# Patient Record
Sex: FEMALE | Race: WHITE | NOT HISPANIC OR LATINO | URBAN - METROPOLITAN AREA
[De-identification: names, ages, dates, MRNs, and addresses within clinical notes are randomized per-mention and may not be internally consistent; named-entity substitution may affect disease eponyms.]

---

## 2017-06-08 ENCOUNTER — IMPORTED ENCOUNTER (OUTPATIENT)
Dept: URBAN - METROPOLITAN AREA CLINIC 50 | Facility: CLINIC | Age: 72
End: 2017-06-08

## 2017-06-12 ENCOUNTER — IMPORTED ENCOUNTER (OUTPATIENT)
Dept: URBAN - METROPOLITAN AREA CLINIC 50 | Facility: CLINIC | Age: 72
End: 2017-06-12

## 2018-06-04 ENCOUNTER — IMPORTED ENCOUNTER (OUTPATIENT)
Dept: URBAN - METROPOLITAN AREA CLINIC 50 | Facility: CLINIC | Age: 73
End: 2018-06-04

## 2019-06-03 ENCOUNTER — IMPORTED ENCOUNTER (OUTPATIENT)
Dept: URBAN - METROPOLITAN AREA CLINIC 50 | Facility: CLINIC | Age: 74
End: 2019-06-03

## 2020-06-03 ENCOUNTER — IMPORTED ENCOUNTER (OUTPATIENT)
Dept: URBAN - METROPOLITAN AREA CLINIC 50 | Facility: CLINIC | Age: 75
End: 2020-06-03

## 2021-04-18 ASSESSMENT — VISUAL ACUITY
OS_SC: 20/30-
OD_SC: 20/30-
OD_SC: 20/30+
OS_CC: J1
OD_SC: 20/30+2
OD_OTHER: >20/400.
OD_BAT: >20/400
OD_OTHER: 20/30. 20/50.
OD_OTHER: 20/60. 20/80.
OD_BAT: 20/60
OD_CC: J1
OD_CC: J1+
OD_CC: 20/30+
OD_PH: 20/25
OD_BAT: >20/400
OD_OTHER: >20/400.
OS_SC: 20/40
OD_BAT: 20/30
OD_CC: J1@ 16 IN
OS_CC: 20/40
OS_SC: 20/40
OS_CC: J1+
OS_CC: J1@ 16 IN

## 2021-04-18 ASSESSMENT — TONOMETRY
OS_IOP_MMHG: 14
OS_IOP_MMHG: 14
OD_IOP_MMHG: 15
OD_IOP_MMHG: 14
OS_IOP_MMHG: 15
OS_IOP_MMHG: 16
OD_IOP_MMHG: 14
OD_IOP_MMHG: 14

## 2021-06-04 ENCOUNTER — PREPPED CHART (OUTPATIENT)
Dept: URBAN - METROPOLITAN AREA CLINIC 49 | Facility: CLINIC | Age: 76
End: 2021-06-04

## 2021-06-07 ENCOUNTER — COMPREHENSIVE EXAM (OUTPATIENT)
Dept: URBAN - METROPOLITAN AREA CLINIC 49 | Facility: CLINIC | Age: 76
End: 2021-06-07

## 2021-06-07 DIAGNOSIS — H52.4: ICD-10-CM

## 2021-06-07 DIAGNOSIS — H40.013: ICD-10-CM

## 2021-06-07 DIAGNOSIS — H26.491: ICD-10-CM

## 2021-06-07 PROCEDURE — 92014 COMPRE OPH EXAM EST PT 1/>: CPT

## 2021-06-07 PROCEDURE — 92015 DETERMINE REFRACTIVE STATE: CPT

## 2021-06-07 ASSESSMENT — VISUAL ACUITY
OU_SC: J1
OD_SC: 20/30
OS_SC: 20/50-2
OD_GLARE: 20/30
OD_GLARE: 20/30

## 2021-06-07 ASSESSMENT — TONOMETRY
OS_IOP_MMHG: 15
OD_IOP_MMHG: 14

## 2021-06-07 NOTE — PATIENT DISCUSSION
Secondary to ON asymm ordered HVF/OCT/PACH as a baseline. Patient will be contacted with results. Per patient no family h/o glaucoma.

## 2021-06-17 ENCOUNTER — DIAGNOSTICS ONLY (OUTPATIENT)
Dept: URBAN - METROPOLITAN AREA CLINIC 49 | Facility: CLINIC | Age: 76
End: 2021-06-17

## 2021-06-17 DIAGNOSIS — H40.013: ICD-10-CM

## 2021-06-17 PROCEDURE — 92083 EXTENDED VISUAL FIELD XM: CPT

## 2021-06-17 PROCEDURE — 92133 CPTRZD OPH DX IMG PST SGM ON: CPT

## 2021-06-17 PROCEDURE — 76514 ECHO EXAM OF EYE THICKNESS: CPT

## 2022-07-18 ENCOUNTER — COMPREHENSIVE EXAM (OUTPATIENT)
Dept: URBAN - METROPOLITAN AREA CLINIC 49 | Facility: CLINIC | Age: 77
End: 2022-07-18

## 2022-07-18 DIAGNOSIS — H26.491: ICD-10-CM

## 2022-07-18 DIAGNOSIS — H43.393: ICD-10-CM

## 2022-07-18 DIAGNOSIS — H04.123: ICD-10-CM

## 2022-07-18 PROCEDURE — 92014 COMPRE OPH EXAM EST PT 1/>: CPT

## 2022-07-18 ASSESSMENT — TONOMETRY
OD_IOP_MMHG: 13
OS_IOP_MMHG: 13

## 2022-07-18 ASSESSMENT — VISUAL ACUITY
OS_PH: 20/40
OD_SC: 20/40-2
OD_GLARE: 20/70
OD_GLARE: 20/40
OU_SC: 20/30-2
OU_SC: J1
OS_SC: 20/40-2

## 2023-07-24 ENCOUNTER — COMPREHENSIVE EXAM (OUTPATIENT)
Dept: URBAN - METROPOLITAN AREA CLINIC 49 | Facility: CLINIC | Age: 78
End: 2023-07-24

## 2023-07-24 DIAGNOSIS — H43.393: ICD-10-CM

## 2023-07-24 DIAGNOSIS — H26.491: ICD-10-CM

## 2023-07-24 DIAGNOSIS — H35.361: ICD-10-CM

## 2023-07-24 DIAGNOSIS — D23.112: ICD-10-CM

## 2023-07-24 DIAGNOSIS — H04.123: ICD-10-CM

## 2023-07-24 PROCEDURE — 92134 CPTRZ OPH DX IMG PST SGM RTA: CPT

## 2023-07-24 PROCEDURE — 92014 COMPRE OPH EXAM EST PT 1/>: CPT

## 2023-07-24 ASSESSMENT — VISUAL ACUITY
OS_PH: 20/30
OD_GLARE: >20/400
OD_PH: 20/50
OD_SC: 20/70
OU_SC: J2@16"
OS_SC: 20/50-2
OD_GLARE: 20/200

## 2023-07-24 ASSESSMENT — TONOMETRY
OD_IOP_MMHG: 14
OS_IOP_MMHG: 14

## 2023-08-02 ENCOUNTER — ESTABLISHED PATIENT (OUTPATIENT)
Dept: URBAN - METROPOLITAN AREA CLINIC 49 | Facility: LOCATION | Age: 78
End: 2023-08-02

## 2023-08-02 DIAGNOSIS — H00.12: ICD-10-CM

## 2023-08-02 PROCEDURE — 92012 INTRM OPH EXAM EST PATIENT: CPT

## 2023-08-02 RX ORDER — CEPHALEXIN 500 MG/1: 1 CAPSULE ORAL TWICE A DAY

## 2023-08-02 ASSESSMENT — VISUAL ACUITY
OD_PH: 20/50
OD_SC: 20/60-1
OS_PH: 20/30
OS_SC: 20/40

## 2023-08-02 ASSESSMENT — TONOMETRY
OD_IOP_MMHG: 14
OS_IOP_MMHG: 14

## 2023-08-16 ENCOUNTER — FOLLOW UP (OUTPATIENT)
Dept: URBAN - METROPOLITAN AREA CLINIC 49 | Facility: LOCATION | Age: 78
End: 2023-08-16

## 2023-08-16 DIAGNOSIS — H02.88A: ICD-10-CM

## 2023-08-16 DIAGNOSIS — H04.123: ICD-10-CM

## 2023-08-16 DIAGNOSIS — H00.12: ICD-10-CM

## 2023-08-16 DIAGNOSIS — H02.88B: ICD-10-CM

## 2023-08-16 DIAGNOSIS — H26.491: ICD-10-CM

## 2023-08-16 PROCEDURE — 92012 INTRM OPH EXAM EST PATIENT: CPT

## 2023-08-16 ASSESSMENT — VISUAL ACUITY
OS_SC: 20/40
OD_SC: 20/70-1/+1
OD_PH: 20/50

## 2023-08-16 ASSESSMENT — TONOMETRY
OD_IOP_MMHG: 14
OS_IOP_MMHG: 14

## 2024-08-02 ENCOUNTER — COMPREHENSIVE EXAM (OUTPATIENT)
Dept: URBAN - METROPOLITAN AREA CLINIC 52 | Facility: CLINIC | Age: 79
End: 2024-08-02

## 2024-08-02 DIAGNOSIS — H35.361: ICD-10-CM

## 2024-08-02 DIAGNOSIS — H04.123: ICD-10-CM

## 2024-08-02 DIAGNOSIS — H43.393: ICD-10-CM

## 2024-08-02 DIAGNOSIS — H52.4: ICD-10-CM

## 2024-08-02 DIAGNOSIS — H02.88B: ICD-10-CM

## 2024-08-02 DIAGNOSIS — H02.88A: ICD-10-CM

## 2024-08-02 DIAGNOSIS — H26.491: ICD-10-CM

## 2024-08-02 PROCEDURE — 92134 CPTRZ OPH DX IMG PST SGM RTA: CPT

## 2024-08-02 PROCEDURE — 92014 COMPRE OPH EXAM EST PT 1/>: CPT

## 2024-08-02 ASSESSMENT — TONOMETRY
OD_IOP_MMHG: 12
OS_IOP_MMHG: 12

## 2024-08-02 ASSESSMENT — VISUAL ACUITY
OU_SC: 20/30-1
OD_GLARE: 20/30
OD_PH: 20/30
OD_SC: 20/50
OU_SC: J1+ @ 15IN
OS_SC: 20/40-1
OD_GLARE: 20/40

## 2025-08-12 ENCOUNTER — COMPREHENSIVE EXAM (OUTPATIENT)
Age: 80
End: 2025-08-12

## 2025-08-12 DIAGNOSIS — H52.4: ICD-10-CM

## 2025-08-12 DIAGNOSIS — H26.491: ICD-10-CM

## 2025-08-12 DIAGNOSIS — H02.88A: ICD-10-CM

## 2025-08-12 DIAGNOSIS — H43.393: ICD-10-CM

## 2025-08-12 DIAGNOSIS — H02.88B: ICD-10-CM

## 2025-08-12 DIAGNOSIS — H10.13: ICD-10-CM

## 2025-08-12 DIAGNOSIS — H40.013: ICD-10-CM

## 2025-08-12 DIAGNOSIS — H04.123: ICD-10-CM

## 2025-08-12 PROCEDURE — 92134 CPTRZ OPH DX IMG PST SGM RTA: CPT | Mod: NC

## 2025-08-12 PROCEDURE — 99214 OFFICE O/P EST MOD 30 MIN: CPT

## 2025-08-12 PROCEDURE — 92015 DETERMINE REFRACTIVE STATE: CPT

## 2025-08-12 RX ORDER — PREDNISONE 20MG 20 MG/1
1 TABLET ORAL ONCE A DAY
Start: 2025-08-12 | End: 2025-08-19